# Patient Record
Sex: FEMALE | URBAN - METROPOLITAN AREA
[De-identification: names, ages, dates, MRNs, and addresses within clinical notes are randomized per-mention and may not be internally consistent; named-entity substitution may affect disease eponyms.]

---

## 2019-09-18 ENCOUNTER — IMPORTED ENCOUNTER (OUTPATIENT)
Dept: URBAN - METROPOLITAN AREA CLINIC 9 | Facility: CLINIC | Age: 84
End: 2019-09-18

## 2021-10-18 ASSESSMENT — VISUAL ACUITY
OD_CC: 20/25 SN
OS_CC: 20/20 SN
OS_CC: 20/20 - SN
OD_CC: 20/20 SN
OD_SC: 20/25 SN
OS_SC: 20/25 - SN

## 2021-10-18 ASSESSMENT — KERATOMETRY
OS_K1POWER_DIOPTERS: 44
OD_K2POWER_DIOPTERS: 45
OD_K1POWER_DIOPTERS: 44.75
OD_AXISANGLE_DEGREES: 164
OD_AXISANGLE2_DEGREES: 74
OS_AXISANGLE2_DEGREES: 18
OS_K2POWER_DIOPTERS: 45
OS_AXISANGLE_DEGREES: 108

## 2021-10-18 ASSESSMENT — TONOMETRY
OD_IOP_MMHG: 13
OS_IOP_MMHG: 14

## 2021-11-30 NOTE — PROCEDURE NOTE: CLINICAL
PROCEDURE NOTE: Avastin 1.25mg #1 OD. Diagnosis: Diabetic Macular Edema. Anesthesia: Topical. Prep: Betadine Drops. Prior to injection, risks/benefits/alternatives discussed including infection, loss of vision, hemorrhage, cataract, glaucoma, retinal tears or detachment. The off-label status of Intravitreal Avastin also was reviewed. The patient wished to proceed with treatment. Topical anesthesia was induced with Alcaine. Additional anesthesia was achieved using drop(s) or injection checked above. A drop of Povidone-iodine 5% ophthalmic solution was instilled over the injection site and in the inferior fornix. Betadine prep was performed. Using the syringe provided, Avastin 1.25 mg in 0.05 cc was injected into the vitreous cavity. The needle was passed 3.0 mm posterior to the limbus in pseudophakic patients, and 3.5 mm posterior to the limbus in phakic patients. Patient tolerated procedure well. There were no complications. Injection time: 3:15PM. Lot #: [id number]. Expiration Date: 2/16/2022. Post-op instructions given. Inventory Id: null. The patient was instructed to return for re-evaluation in approximately 4-12 weeks depending on his/her condition and was told to call immediately if vision decreases and/or if his/her eye becomes red, painful, and/or light sensitive. The patient was instructed to go to the emergency room or call 911 if unable to reach the doctor within an hour or two of trying or calling. The patient was instructed to use Artificial Tears q.i.d. p.r.n for comfort. Favio Junior

## 2022-03-17 NOTE — PROCEDURE NOTE: CLINICAL
PROCEDURE NOTE: Focal Laser, Retina OD. Diagnosis: Diabetic Macular Edema. Anesthesia: Topical. Prior to focal laser, risks/benefits/alternatives to laser discussed including loss of vision and patient wished to proceed. An informed consent was obtained and no assurances or guarantees were given. Spot size:100 * um. Power: 50* mW. Number of pulses: 15*. Pulse duration:80 * ms. Procedure Time: 305PM*. Patient tolerated procedure well. There were no complications. Post procedure instructions given. Patient given office phone number/answering service number and advised to call immediately should there be loss of vision or pain, or should they have any other questions or concerns. Bandar Correa

## 2022-06-24 NOTE — PATIENT DISCUSSION
Continue to MONITOR CLOSELY to determine the need for TREATMENT and INCREASE/DECREASE in length of time till next follow up visit. IP  GROUP DOCUMENTATION INDIVIDUAL Group Therapy Note Date: November 11 Group Start Time: 200 Group End Time: 1800 Group Topic: Nursing [facility] 1 ADULT CHEM DEP Marah Barber, NORAH; Marrion Buerger, RN IP  GROUP DOCUMENTATION GROUP Group Therapy Note Attendees: 5 Attendance: Did not attend Kriss Gee RN